# Patient Record
Sex: MALE | Race: WHITE | NOT HISPANIC OR LATINO | Employment: FULL TIME | URBAN - METROPOLITAN AREA
[De-identification: names, ages, dates, MRNs, and addresses within clinical notes are randomized per-mention and may not be internally consistent; named-entity substitution may affect disease eponyms.]

---

## 2025-03-29 ENCOUNTER — OFFICE VISIT (OUTPATIENT)
Dept: URGENT CARE | Age: 50
End: 2025-03-29
Payer: COMMERCIAL

## 2025-03-29 VITALS
SYSTOLIC BLOOD PRESSURE: 114 MMHG | TEMPERATURE: 98.2 F | OXYGEN SATURATION: 98 % | RESPIRATION RATE: 18 BRPM | DIASTOLIC BLOOD PRESSURE: 76 MMHG | BODY MASS INDEX: 32 KG/M2 | HEART RATE: 74 BPM | WEIGHT: 212 LBS

## 2025-03-29 DIAGNOSIS — B34.8 RHINOVIRUS: Primary | ICD-10-CM

## 2025-03-29 DIAGNOSIS — J06.9 VIRAL URI: ICD-10-CM

## 2025-03-29 PROBLEM — R07.89 CHEST WALL PAIN: Status: ACTIVE | Noted: 2025-03-29

## 2025-03-29 PROBLEM — E55.9 VITAMIN D DEFICIENCY: Status: ACTIVE | Noted: 2025-03-29

## 2025-03-29 PROBLEM — G44.321 INTRACTABLE CHRONIC POST-TRAUMATIC HEADACHE: Status: ACTIVE | Noted: 2018-09-26

## 2025-03-29 PROBLEM — M79.2 NEURALGIA AND NEURITIS: Status: ACTIVE | Noted: 2018-09-26

## 2025-03-29 PROBLEM — R79.89 ABNORMAL LIVER FUNCTION TEST: Status: ACTIVE | Noted: 2025-03-29

## 2025-03-29 PROBLEM — R53.83 FATIGUE: Status: ACTIVE | Noted: 2025-03-29

## 2025-03-29 LAB
POC CORONAVIRUS SARS-COV-2 PCR: NEGATIVE
POC HUMAN RHINOVIRUS PCR: POSITIVE
POC INFLUENZA A VIRUS PCR: NEGATIVE
POC INFLUENZA B VIRUS PCR: NEGATIVE
POC RESPIRATORY SYNCYTIAL VIRUS PCR: NEGATIVE

## 2025-03-29 RX ORDER — CETIRIZINE HYDROCHLORIDE 10 MG/1
10 TABLET ORAL DAILY
Qty: 30 TABLET | Refills: 2 | Status: SHIPPED | OUTPATIENT
Start: 2025-03-29 | End: 2025-06-27

## 2025-03-29 RX ORDER — FLUTICASONE PROPIONATE 50 MCG
2 SPRAY, SUSPENSION (ML) NASAL DAILY
Qty: 16 G | Refills: 0 | Status: SHIPPED | OUTPATIENT
Start: 2025-03-29 | End: 2025-04-12

## 2025-03-29 RX ORDER — BROMPHENIRAMINE MALEATE, PSEUDOEPHEDRINE HYDROCHLORIDE, AND DEXTROMETHORPHAN HYDROBROMIDE 2; 30; 10 MG/5ML; MG/5ML; MG/5ML
10 SYRUP ORAL 4 TIMES DAILY PRN
Qty: 200 ML | Refills: 0 | Status: SHIPPED | OUTPATIENT
Start: 2025-03-29 | End: 2025-04-08

## 2025-03-29 RX ORDER — LEVOCETIRIZINE DIHYDROCHLORIDE 5 MG/1
1 TABLET, FILM COATED ORAL EVERY EVENING
COMMUNITY
Start: 2022-05-16

## 2025-03-29 RX ORDER — PEDIATRIC MULTIVIT 61/D3/VIT K 1500-800
CAPSULE ORAL
COMMUNITY

## 2025-03-29 RX ORDER — PREDNISONE 20 MG/1
20 TABLET ORAL DAILY
Qty: 5 TABLET | Refills: 0 | Status: SHIPPED | OUTPATIENT
Start: 2025-03-29 | End: 2025-04-03

## 2025-03-29 ASSESSMENT — ENCOUNTER SYMPTOMS
RHINORRHEA: 1
FEVER: 0
CHILLS: 0
VOMITING: 0
NAUSEA: 0
FATIGUE: 1
ABDOMINAL PAIN: 0
COUGH: 1
GASTROINTESTINAL NEGATIVE: 1
SORE THROAT: 1
CARDIOVASCULAR NEGATIVE: 1
SINUS PRESSURE: 1
HEADACHES: 1
DIARRHEA: 0

## 2025-03-29 NOTE — PROGRESS NOTES
Subjective   Patient ID: Shelby Lance is a 49 y.o. male. They present today with a chief complaint of URI (3 days ).    History of Present Illness  Subjective  History was provided by the patient.  Shelby Lance is a 49 y.o. male who presents for evaluation of symptoms of a URI. Symptoms include dry cough, headache, nasal blockage, post nasal drip, sinus and nasal congestion, and sore throat. Onset of symptoms was 5 days ago, unchanged since that time. Associated symptoms include bilateral ear pressure/pain. He is drinking moderate amounts of fluids. Evaluation to date: none. Treatment to date: decongestants    Objective  /76   Pulse 74   Temp 36.8 °C (98.2 °F)   Resp 18   Wt 96.2 kg (212 lb)   SpO2 98%   BMI 32.00 kg/m²   [unfilled]     Assessment/Plan  Rhinovirus    Discussed diagnosis and treatment of URI.  Suggested symptomatic OTC remedies.  Nasal saline spray for congestion.  Nasal steroids per orders.  Follow up as needed.        History provided by:  Patient and medical records      Past Medical History  Allergies as of 03/29/2025    (No Known Allergies)       (Not in a hospital admission)       History reviewed. No pertinent past medical history.    History reviewed. No pertinent surgical history.     reports that he has never smoked. He has never used smokeless tobacco.    Review of Systems  Review of Systems   Constitutional:  Positive for fatigue. Negative for chills and fever.   HENT:  Positive for congestion, ear pain, rhinorrhea, sinus pressure and sore throat.    Respiratory:  Positive for cough.    Cardiovascular: Negative.    Gastrointestinal: Negative.  Negative for abdominal pain, diarrhea, nausea and vomiting.   Genitourinary: Negative.    Neurological:  Positive for headaches.   All other systems reviewed and are negative.                                 Objective    Vitals:    03/29/25 1151   BP: 114/76   Pulse: 74   Resp: 18   Temp: 36.8 °C (98.2 °F)   SpO2: 98%   Weight:  96.2 kg (212 lb)     No LMP for male patient.    Physical Exam  Vitals and nursing note reviewed.   Constitutional:       General: He is not in acute distress.     Appearance: Normal appearance. He is ill-appearing.   HENT:      Head: Normocephalic and atraumatic.      Right Ear: A middle ear effusion is present.      Left Ear: A middle ear effusion is present.      Nose: Mucosal edema, congestion and rhinorrhea present. Rhinorrhea is clear.      Right Turbinates: Swollen.      Left Turbinates: Swollen.      Mouth/Throat:      Lips: Pink.      Mouth: Mucous membranes are moist.      Pharynx: Oropharynx is clear. Uvula midline. Postnasal drip present. No posterior oropharyngeal erythema.   Cardiovascular:      Rate and Rhythm: Normal rate and regular rhythm.      Pulses: Normal pulses.      Heart sounds: Normal heart sounds.   Pulmonary:      Effort: Pulmonary effort is normal.      Breath sounds: Normal breath sounds and air entry. No decreased breath sounds or wheezing.   Musculoskeletal:      Cervical back: Normal range of motion and neck supple.   Skin:     General: Skin is warm and dry.      Capillary Refill: Capillary refill takes less than 2 seconds.   Neurological:      Mental Status: He is alert and oriented to person, place, and time.   Psychiatric:         Behavior: Behavior normal.         Procedures    Point of Care Test & Imaging Results from this visit  Results for orders placed or performed in visit on 03/29/25   POCT SPOTFIRE R/ST Panel Mini w/COVID ([facility]) manually resulted    Specimen: Swab   Result Value Ref Range    POC Sars-Cov-2 PCR Negative Negative    POC Respiratory Syncytial Virus PCR Negative Negative    POC Influenza A Virus PCR Negative Negative    POC Influenza B Virus PCR Negative Negative    POC Human Rhinovirus PCR Positive (A) Negative      Imaging  No results found.    Cardiology, Vascular, and Other Imaging  No other imaging results found for the past 2 days      Diagnostic  study results (if any) were reviewed by GIUSEPPE [facility].    Assessment/Plan   Allergies, medications, history, and pertinent labs/EKGs/Imaging reviewed by GI[facility]PPE [facility].     Medical Decision Making  Risks, benefits, and alternatives of the medications and treatment plan prescribed today were discussed, and patient expressed understanding. Plan follow up as discussed or as needed if any worsening symptoms or change in condition. Reinforced red flags including (but not limited to): severe or worsening pain; difficulty swallowing; stiff neck; shortness of breath; coughing or vomiting blood; chest pain; and new or increased fever are indications to go to the Emergency Department.  At time of discharge patient was clinically well-appearing and HDS for outpatient management. The patient and/or family was educated regarding diagnosis, supportive care, OTC and Rx medications. The patient and/or family was given the opportunity to ask questions prior to discharge.  They verbalized understanding of my discussion of the plans for treatment, expected course, indications to return to  or seek further evaluation in ED, and the need for timely follow up as directed.   They were provided with a work/school excuse if requested. The after-visit summary was given to the patient and care instructions were reviewed with the patient. All questions were answered and the patient verbalized understanding of the plan of care for today.  Plan:  Recent visit notes reviewed  Meds as above  Increase clear fluids  Pcp follow up this week if not improving or worsening  ER visit anytime 24/7 for acute worsening or changing condition      Orders and Diagnoses  Diagnoses and all orders for this visit:  Rhinovirus  -     cetirizine (ZyrTEC) 10 mg tablet; Take 1 tablet (10 mg) by mouth once daily.  -     fluticasone (Flonase) 50 mcg/actuation nasal spray; Administer 2 sprays into each nostril once daily for 14 days. Shake  gently. Before first use, prime pump. After use, clean tip and replace cap.  -     predniSONE (Deltasone) 20 mg tablet; Take 1 tablet (20 mg) by mouth once daily for 5 days.  -     brompheniramine-pseudoeph-DM 2-30-10 mg/5 mL syrup; Take 10 mL by mouth 4 times a day as needed for cough or congestion for up to 10 days.  -     Referral to Primary Care; Future  Viral URI  -     POCT SPOTFIRE R/ST Panel Mini w/COVID ([facility]) manually resulted  -     cetirizine (ZyrTEC) 10 mg tablet; Take 1 tablet (10 mg) by mouth once daily.  -     fluticasone (Flonase) 50 mcg/actuation nasal spray; Administer 2 sprays into each nostril once daily for 14 days. Shake gently. Before first use, prime pump. After use, clean tip and replace cap.  -     predniSONE (Deltasone) 20 mg tablet; Take 1 tablet (20 mg) by mouth once daily for 5 days.  -     brompheniramine-pseudoeph-DM 2-30-10 mg/5 mL syrup; Take 10 mL by mouth 4 times a day as needed for cough or congestion for up to 10 days.  -     Referral to Primary Care; Future      Medical Admin Record      Patient disposition: Home    Electronically signed by GIUSEPPE Vaca  12:37 PM